# Patient Record
Sex: MALE | Race: WHITE | NOT HISPANIC OR LATINO | Employment: OTHER | URBAN - METROPOLITAN AREA
[De-identification: names, ages, dates, MRNs, and addresses within clinical notes are randomized per-mention and may not be internally consistent; named-entity substitution may affect disease eponyms.]

---

## 2017-03-03 ENCOUNTER — APPOINTMENT (OUTPATIENT)
Dept: CT IMAGING | Facility: HOSPITAL | Age: 72
End: 2017-03-03

## 2017-03-03 ENCOUNTER — HOSPITAL ENCOUNTER (EMERGENCY)
Facility: HOSPITAL | Age: 72
Discharge: HOME | End: 2017-03-03
Attending: EMERGENCY MEDICINE | Admitting: EMERGENCY MEDICINE

## 2017-03-03 VITALS
HEIGHT: 73 IN | DIASTOLIC BLOOD PRESSURE: 77 MMHG | SYSTOLIC BLOOD PRESSURE: 140 MMHG | WEIGHT: 187 LBS | HEART RATE: 70 BPM | TEMPERATURE: 97.5 F | BODY MASS INDEX: 24.78 KG/M2 | OXYGEN SATURATION: 95 % | RESPIRATION RATE: 18 BRPM

## 2017-03-03 DIAGNOSIS — M12.9 ARTHROPATHY: Primary | ICD-10-CM

## 2017-03-03 LAB
ALBUMIN SERPL-MCNC: 4.3 G/DL (ref 3.2–4.8)
ALBUMIN/GLOB SERPL: 0.9 G/DL (ref 1.5–2.5)
ALP SERPL-CCNC: 75 U/L (ref 25–100)
ALT SERPL W P-5'-P-CCNC: 13 U/L (ref 7–40)
ANION GAP SERPL CALCULATED.3IONS-SCNC: 4 MMOL/L (ref 3–11)
AST SERPL-CCNC: 23 U/L (ref 0–33)
BASOPHILS # BLD MANUAL: 0.07 10*3/MM3 (ref 0–0.2)
BASOPHILS NFR BLD AUTO: 1 % (ref 0–1)
BILIRUB SERPL-MCNC: 0.8 MG/DL (ref 0.3–1.2)
BUN BLD-MCNC: 20 MG/DL (ref 9–23)
BUN/CREAT SERPL: 22.2 (ref 7–25)
CALCIUM SPEC-SCNC: 10.2 MG/DL (ref 8.7–10.4)
CHLORIDE SERPL-SCNC: 101 MMOL/L (ref 99–109)
CO2 SERPL-SCNC: 31 MMOL/L (ref 20–31)
CREAT BLD-MCNC: 0.9 MG/DL (ref 0.6–1.3)
CRP SERPL-MCNC: 5.9 MG/DL (ref 0–10)
DEPRECATED RDW RBC AUTO: 48.6 FL (ref 37–54)
ELLIPTOCYTES BLD QL SMEAR: ABNORMAL
EOSINOPHIL # BLD MANUAL: 0 10*3/MM3 (ref 0.1–0.3)
EOSINOPHIL NFR BLD MANUAL: 0 % (ref 0–3)
ERYTHROCYTE [DISTWIDTH] IN BLOOD BY AUTOMATED COUNT: 16.3 % (ref 11.3–14.5)
ERYTHROCYTE [SEDIMENTATION RATE] IN BLOOD: 39 MM/HR (ref 0–20)
GFR SERPL CREATININE-BSD FRML MDRD: 83 ML/MIN/1.73
GLOBULIN UR ELPH-MCNC: 4.6 GM/DL
GLUCOSE BLD-MCNC: 96 MG/DL (ref 70–100)
HCT VFR BLD AUTO: 39.8 % (ref 38.9–50.9)
HGB BLD-MCNC: 13.7 G/DL (ref 13.1–17.5)
LYMPHOCYTES # BLD MANUAL: 1.76 10*3/MM3 (ref 0.6–4.8)
LYMPHOCYTES NFR BLD MANUAL: 26 % (ref 24–44)
LYMPHOCYTES NFR BLD MANUAL: 7 % (ref 0–12)
MCH RBC QN AUTO: 28.2 PG (ref 27–31)
MCHC RBC AUTO-ENTMCNC: 34.4 G/DL (ref 32–36)
MCV RBC AUTO: 81.9 FL (ref 80–99)
METAMYELOCYTES NFR BLD MANUAL: 2 % (ref 0–0)
MONOCYTES # BLD AUTO: 0.47 10*3/MM3 (ref 0–1)
NEUTROPHILS # BLD AUTO: 4.19 10*3/MM3 (ref 1.5–8.3)
NEUTROPHILS NFR BLD MANUAL: 59 % (ref 41–71)
NEUTS BAND NFR BLD MANUAL: 3 % (ref 0–5)
PLAT MORPH BLD: NORMAL
PLATELET # BLD AUTO: 337 10*3/MM3 (ref 150–450)
PMV BLD AUTO: 9.1 FL (ref 6–12)
POLYCHROMASIA BLD QL SMEAR: ABNORMAL
POTASSIUM BLD-SCNC: 4.4 MMOL/L (ref 3.5–5.5)
PROT SERPL-MCNC: 8.9 G/DL (ref 5.7–8.2)
RBC # BLD AUTO: 4.86 10*6/MM3 (ref 4.2–5.76)
SODIUM BLD-SCNC: 136 MMOL/L (ref 132–146)
URATE SERPL-MCNC: 10.3 MG/DL (ref 3.7–9.2)
VARIANT LYMPHS NFR BLD MANUAL: 2 % (ref 0–5)
WBC MORPH BLD: NORMAL
WBC NRBC COR # BLD: 6.76 10*3/MM3 (ref 3.5–10.8)

## 2017-03-03 PROCEDURE — 80053 COMPREHEN METABOLIC PANEL: CPT | Performed by: EMERGENCY MEDICINE

## 2017-03-03 PROCEDURE — 73700 CT LOWER EXTREMITY W/O DYE: CPT

## 2017-03-03 PROCEDURE — 85652 RBC SED RATE AUTOMATED: CPT | Performed by: EMERGENCY MEDICINE

## 2017-03-03 PROCEDURE — 99284 EMERGENCY DEPT VISIT MOD MDM: CPT

## 2017-03-03 PROCEDURE — 86140 C-REACTIVE PROTEIN: CPT | Performed by: EMERGENCY MEDICINE

## 2017-03-03 PROCEDURE — 63710000001 PREDNISONE PER 5 MG: Performed by: EMERGENCY MEDICINE

## 2017-03-03 PROCEDURE — 84550 ASSAY OF BLOOD/URIC ACID: CPT | Performed by: EMERGENCY MEDICINE

## 2017-03-03 PROCEDURE — 85007 BL SMEAR W/DIFF WBC COUNT: CPT | Performed by: EMERGENCY MEDICINE

## 2017-03-03 PROCEDURE — 85025 COMPLETE CBC W/AUTO DIFF WBC: CPT | Performed by: EMERGENCY MEDICINE

## 2017-03-03 RX ORDER — OXYCODONE HYDROCHLORIDE 5 MG/1
TABLET ORAL
Status: COMPLETED
Start: 2017-03-03 | End: 2017-03-03

## 2017-03-03 RX ORDER — COLCHICINE 0.6 MG/1
0.6 TABLET ORAL 2 TIMES DAILY
Qty: 9 TABLET | Refills: 0 | Status: SHIPPED | OUTPATIENT
Start: 2017-03-03

## 2017-03-03 RX ORDER — COLCHICINE 0.6 MG/1
0.6 TABLET ORAL DAILY
Status: DISCONTINUED | OUTPATIENT
Start: 2017-03-03 | End: 2017-03-03 | Stop reason: HOSPADM

## 2017-03-03 RX ORDER — PREDNISONE 20 MG/1
20 TABLET ORAL 2 TIMES DAILY
Qty: 9 TABLET | Refills: 0 | Status: SHIPPED | OUTPATIENT
Start: 2017-03-03

## 2017-03-03 RX ORDER — PREDNISONE 20 MG/1
20 TABLET ORAL ONCE
Status: COMPLETED | OUTPATIENT
Start: 2017-03-03 | End: 2017-03-03

## 2017-03-03 RX ORDER — OXYCODONE HYDROCHLORIDE 5 MG/1
10 TABLET ORAL ONCE
Status: COMPLETED | OUTPATIENT
Start: 2017-03-03 | End: 2017-03-03

## 2017-03-03 RX ADMIN — OXYCODONE HYDROCHLORIDE 5 MG: 5 TABLET ORAL at 10:32

## 2017-03-03 RX ADMIN — PREDNISONE 20 MG: 20 TABLET ORAL at 15:19

## 2017-03-03 RX ADMIN — OXYCODONE HYDROCHLORIDE 5 MG: 5 TABLET ORAL at 10:29

## 2017-03-03 RX ADMIN — COLCHICINE 0.6 MG: 0.6 TABLET, FILM COATED ORAL at 15:19

## 2017-03-03 NOTE — DISCHARGE INSTRUCTIONS
Keep foot elevated. Bear weight as you are able.    Return to the Emergency Department for new or worsening concerns.

## 2017-03-03 NOTE — ED PROVIDER NOTES
Subjective   HPI Comments: Bhupendra Coombs is a 71 y.o.male who presents to the ED c/o RLE pain and swelling since 1/1/17. Pt states he was admitted to [facility] with PNA when he started developing right foot pain. Then on January 1st stents were placed in his right common femoral and right popliteal arteries to improve blood flow. Since then he has had worsening right leg pain and swelling especially in his foot. He was scheduled to have further bypass surgery today, however, his pulmonologist canceled it over concern for a spot on his lungs. He describes a soreness throughout his leg with occasional burning pain in his foot. He denies any cough, rhinorrhea, SOA or CP. Pt is a smoker and uses an inhaler and home O2.    Dr. Gann is his cardiologist.    PMHx of HTN and COPD, but no CVA and MI.    Patient is a 71 y.o. male presenting with lower extremity pain.   History provided by:  Patient and spouse  Lower Extremity Issue   Location:  Leg  Time since incident:  2 months  Leg location:  R leg  Pain details:     Quality:  Aching and burning    Radiates to:  Does not radiate    Severity:  Severe    Duration:  2 months    Timing:  Constant    Progression:  Worsening  Chronicity:  New  Dislocation: no    Associated symptoms: swelling    Associated symptoms: no stiffness    Swelling:     Location:  Feet    Duration:  2 months    Timing:  Constant    Progression:  Worsening    Chronicity:  New  Risk factors: recent illness        Review of Systems   HENT: Negative for rhinorrhea.    Respiratory: Negative for cough and shortness of breath.    Cardiovascular: Negative for chest pain.   Musculoskeletal: Negative for stiffness.   All other systems reviewed and are negative.      No past medical history on file.    Allergies   Allergen Reactions   • Sulfa Antibiotics        No past surgical history on file.    No family history on file.    Social History     Social History   • Marital status:      Spouse name:  N/A   • Number of children: N/A   • Years of education: N/A     Social History Main Topics   • Smoking status: Light Tobacco Smoker     Types: Cigarettes   • Smokeless tobacco: Not on file   • Alcohol use No   • Drug use: No   • Sexual activity: Not on file     Other Topics Concern   • Not on file     Social History Narrative   • No narrative on file         Objective   Physical Exam   Constitutional: He is oriented to person, place, and time. He appears well-developed and well-nourished.   HENT:   Head: Normocephalic and atraumatic.   Right Ear: External ear normal.   Left Ear: External ear normal.   Nose: Nose normal.   Mouth/Throat: Oropharynx is clear and moist.   Airway is patent and pharynx is benign.   Eyes: Conjunctivae and EOM are normal. No scleral icterus.   Neck: Normal range of motion. Neck supple. No JVD present.   Cardiovascular: Normal rate, regular rhythm and normal heart sounds.  Exam reveals no gallop and no friction rub.    No murmur heard.  Good right femoral pulse. Significant bruit in right femoral artery. Could not feel right popliteal artery pulse or dorsalis pedis pulse .   Pulmonary/Chest: Effort normal and breath sounds normal. No respiratory distress. He has no wheezes. He has no rales.   Abdominal: Soft. There is no tenderness.   Musculoskeletal: Normal range of motion. He exhibits edema (Moderate swelling of right foot.) and tenderness (Minimal tenderness in right thigh. Moderate tenderness in lower leg. Severe tenderness in right foot.).   Lymphadenopathy:     He has no cervical adenopathy.   Neurological: He is alert and oriented to person, place, and time.   Skin: Skin is warm and dry. There is erythema (Moderate amount of erythema of right foot, not consistent with cellulitis.).   Right foot is warm.   Psychiatric: He has a normal mood and affect. His behavior is normal.   Nursing note and vitals reviewed.      Procedures         ED Course  ED Course   Comment By Time   Definitive  diagnosis is difficult to come up with.  The problem does not seem to be ischemic to me, as his foot is warm with good capillary refill.  He may have an atypical gout attack with elevated uric acid suggesting that.  I will treat him with twice a day colchicine and prednisone for 5 days.  I will refer him to an orthopedist.  Of course, he still has the issues of possible pulmonary malignancy to deal with. Ruperto Clark MD 03/03 1451       Recent Results (from the past 24 hour(s))   Comprehensive Metabolic Panel    Collection Time: 03/03/17 10:23 AM   Result Value Ref Range    Glucose 96 70 - 100 mg/dL    BUN 20 9 - 23 mg/dL    Creatinine 0.90 0.60 - 1.30 mg/dL    Sodium 136 132 - 146 mmol/L    Potassium 4.4 3.5 - 5.5 mmol/L    Chloride 101 99 - 109 mmol/L    CO2 31.0 20.0 - 31.0 mmol/L    Calcium 10.2 8.7 - 10.4 mg/dL    Total Protein 8.9 (H) 5.7 - 8.2 g/dL    Albumin 4.30 3.20 - 4.80 g/dL    ALT (SGPT) 13 7 - 40 U/L    AST (SGOT) 23 0 - 33 U/L    Alkaline Phosphatase 75 25 - 100 U/L    Total Bilirubin 0.8 0.3 - 1.2 mg/dL    eGFR Non African Amer 83 >60 mL/min/1.73    Globulin 4.6 gm/dL    A/G Ratio 0.9 (L) 1.5 - 2.5 g/dL    BUN/Creatinine Ratio 22.2 7.0 - 25.0    Anion Gap 4.0 3.0 - 11.0 mmol/L   C-reactive Protein    Collection Time: 03/03/17 10:23 AM   Result Value Ref Range    C-Reactive Protein 5.90 0.00 - 10.00 mg/dL   Sedimentation Rate    Collection Time: 03/03/17 10:23 AM   Result Value Ref Range    Sed Rate 39 (H) 0 - 20 mm/hr   Uric Acid    Collection Time: 03/03/17 10:23 AM   Result Value Ref Range    Uric Acid 10.3 (H) 3.7 - 9.2 mg/dL   CBC Auto Differential    Collection Time: 03/03/17 10:23 AM   Result Value Ref Range    WBC 6.76 3.50 - 10.80 10*3/mm3    RBC 4.86 4.20 - 5.76 10*6/mm3    Hemoglobin 13.7 13.1 - 17.5 g/dL    Hematocrit 39.8 38.9 - 50.9 %    MCV 81.9 80.0 - 99.0 fL    MCH 28.2 27.0 - 31.0 pg    MCHC 34.4 32.0 - 36.0 g/dL    RDW 16.3 (H) 11.3 - 14.5 %    RDW-SD 48.6 37.0 - 54.0 fl    MPV  9.1 6.0 - 12.0 fL    Platelets 337 150 - 450 10*3/mm3   Manual Differential    Collection Time: 03/03/17 10:23 AM   Result Value Ref Range    Neutrophil % 59.0 41.0 - 71.0 %    Lymphocyte % 26.0 24.0 - 44.0 %    Monocyte % 7.0 0.0 - 12.0 %    Eosinophil % 0.0 0.0 - 3.0 %    Basophil % 1.0 0.0 - 1.0 %    Bands %  3.0 0.0 - 5.0 %    Metamyelocyte % 2.0 (H) 0.0 - 0.0 %    Atypical Lymphocyte % 2.0 0.0 - 5.0 %    Neutrophils Absolute 4.19 1.50 - 8.30 10*3/mm3    Lymphocytes Absolute 1.76 0.60 - 4.80 10*3/mm3    Monocytes Absolute 0.47 0.00 - 1.00 10*3/mm3    Eosinophils Absolute 0.00 (L) 0.10 - 0.30 10*3/mm3    Basophils Absolute 0.07 0.00 - 0.20 10*3/mm3    Elliptocytes Mod/2+ None Seen    Polychromasia Slight/1+ None Seen    WBC Morphology Normal Normal    Platelet Morphology Normal Normal     Note: In addition to lab results from this visit, the labs listed above may include labs taken at another facility or during a different encounter within the last 24 hours. Please correlate lab times with ED admission and discharge times for further clarification of the services performed during this visit.    CT Lower Extremity Right Without Contrast   Final Result   1. Marked diffuse subcutis, deep muscle and integument edema is noted   throughout the right lower leg, right ankle and right foot.   2. Severe hypertrophic degenerative arthropathy is seen in the middle   and distal third of the right first metatarsal extending to the   metatarsophalangeal joint where there is bone on bone and high-grade   bulky reactive osteophytes with loose bodies surrounding the severe   arthropathic abnormality in the right first    metatarsophalangeal joint. Osteoarthropathy versus gout are the   considerations.   3. Active or destructive bone lesion is not identified. Acute fracture   or stress related abnormality of the periosteum is not identified. Ankle   and hindfoot are intact without evidence of focal mass or bone   destruction.  Although there is edema diffusely in the right foot which   is worse in the right forefoot, there is no dominant focal mass or   drainable collection and no other acute findings are noted. Extensive   arterial vascular calcifications are noted.       D:  03/03/2017   E:  03/03/2017       This report was finalized on 3/3/2017 2:10 PM by Dr. Santos Flores MD.            Vitals:    03/03/17 1300 03/03/17 1340 03/03/17 1400 03/03/17 1520   BP: 127/70 129/74 124/84 140/77   BP Location:    Left arm   Patient Position:    Sitting   Pulse: 60  64 70   Resp:    18   Temp:    97.5 °F (36.4 °C)   TempSrc:    Oral   SpO2: 94%  94% 95%   Weight:       Height:         Medications   oxyCODONE (ROXICODONE) immediate release tablet 10 mg (5 mg Oral Given 3/3/17 1029)   oxyCODONE (ROXICODONE) 5 MG immediate release tablet  - ADS Override Pull (5 mg  Given 3/3/17 1032)   predniSONE (DELTASONE) tablet 20 mg (20 mg Oral Given 3/3/17 1519)     ECG/EMG Results (last 24 hours)     ** No results found for the last 24 hours. **        Recent Results (from the past 24 hour(s))   Comprehensive Metabolic Panel    Collection Time: 03/03/17 10:23 AM   Result Value Ref Range    Glucose 96 70 - 100 mg/dL    BUN 20 9 - 23 mg/dL    Creatinine 0.90 0.60 - 1.30 mg/dL    Sodium 136 132 - 146 mmol/L    Potassium 4.4 3.5 - 5.5 mmol/L    Chloride 101 99 - 109 mmol/L    CO2 31.0 20.0 - 31.0 mmol/L    Calcium 10.2 8.7 - 10.4 mg/dL    Total Protein 8.9 (H) 5.7 - 8.2 g/dL    Albumin 4.30 3.20 - 4.80 g/dL    ALT (SGPT) 13 7 - 40 U/L    AST (SGOT) 23 0 - 33 U/L    Alkaline Phosphatase 75 25 - 100 U/L    Total Bilirubin 0.8 0.3 - 1.2 mg/dL    eGFR Non African Amer 83 >60 mL/min/1.73    Globulin 4.6 gm/dL    A/G Ratio 0.9 (L) 1.5 - 2.5 g/dL    BUN/Creatinine Ratio 22.2 7.0 - 25.0    Anion Gap 4.0 3.0 - 11.0 mmol/L   C-reactive Protein    Collection Time: 03/03/17 10:23 AM   Result Value Ref Range    C-Reactive Protein 5.90 0.00 - 10.00 mg/dL   Sedimentation Rate     Collection Time: 03/03/17 10:23 AM   Result Value Ref Range    Sed Rate 39 (H) 0 - 20 mm/hr   Uric Acid    Collection Time: 03/03/17 10:23 AM   Result Value Ref Range    Uric Acid 10.3 (H) 3.7 - 9.2 mg/dL   CBC Auto Differential    Collection Time: 03/03/17 10:23 AM   Result Value Ref Range    WBC 6.76 3.50 - 10.80 10*3/mm3    RBC 4.86 4.20 - 5.76 10*6/mm3    Hemoglobin 13.7 13.1 - 17.5 g/dL    Hematocrit 39.8 38.9 - 50.9 %    MCV 81.9 80.0 - 99.0 fL    MCH 28.2 27.0 - 31.0 pg    MCHC 34.4 32.0 - 36.0 g/dL    RDW 16.3 (H) 11.3 - 14.5 %    RDW-SD 48.6 37.0 - 54.0 fl    MPV 9.1 6.0 - 12.0 fL    Platelets 337 150 - 450 10*3/mm3   Manual Differential    Collection Time: 03/03/17 10:23 AM   Result Value Ref Range    Neutrophil % 59.0 41.0 - 71.0 %    Lymphocyte % 26.0 24.0 - 44.0 %    Monocyte % 7.0 0.0 - 12.0 %    Eosinophil % 0.0 0.0 - 3.0 %    Basophil % 1.0 0.0 - 1.0 %    Bands %  3.0 0.0 - 5.0 %    Metamyelocyte % 2.0 (H) 0.0 - 0.0 %    Atypical Lymphocyte % 2.0 0.0 - 5.0 %    Neutrophils Absolute 4.19 1.50 - 8.30 10*3/mm3    Lymphocytes Absolute 1.76 0.60 - 4.80 10*3/mm3    Monocytes Absolute 0.47 0.00 - 1.00 10*3/mm3    Eosinophils Absolute 0.00 (L) 0.10 - 0.30 10*3/mm3    Basophils Absolute 0.07 0.00 - 0.20 10*3/mm3    Elliptocytes Mod/2+ None Seen    Polychromasia Slight/1+ None Seen    WBC Morphology Normal Normal    Platelet Morphology Normal Normal     Note: In addition to lab results from this visit, the labs listed above may include labs taken at another facility or during a different encounter within the last 24 hours. Please correlate lab times with ED admission and discharge times for further clarification of the services performed during this visit.    CT Lower Extremity Right Without Contrast   Final Result   1. Marked diffuse subcutis, deep muscle and integument edema is noted   throughout the right lower leg, right ankle and right foot.   2. Severe hypertrophic degenerative arthropathy is seen in  the middle   and distal third of the right first metatarsal extending to the   metatarsophalangeal joint where there is bone on bone and high-grade   bulky reactive osteophytes with loose bodies surrounding the severe   arthropathic abnormality in the right first    metatarsophalangeal joint. Osteoarthropathy versus gout are the   considerations.   3. Active or destructive bone lesion is not identified. Acute fracture   or stress related abnormality of the periosteum is not identified. Ankle   and hindfoot are intact without evidence of focal mass or bone   destruction. Although there is edema diffusely in the right foot which   is worse in the right forefoot, there is no dominant focal mass or   drainable collection and no other acute findings are noted. Extensive   arterial vascular calcifications are noted.       D:  03/03/2017   E:  03/03/2017       This report was finalized on 3/3/2017 2:10 PM by Dr. Santos Flores MD.            Vitals:    03/03/17 1300 03/03/17 1340 03/03/17 1400 03/03/17 1520   BP: 127/70 129/74 124/84 140/77   BP Location:    Left arm   Patient Position:    Sitting   Pulse: 60  64 70   Resp:    18   Temp:    97.5 °F (36.4 °C)   TempSrc:    Oral   SpO2: 94%  94% 95%   Weight:       Height:         Medications   oxyCODONE (ROXICODONE) immediate release tablet 10 mg (5 mg Oral Given 3/3/17 1029)   oxyCODONE (ROXICODONE) 5 MG immediate release tablet  - ADS Override Pull (5 mg  Given 3/3/17 1032)   predniSONE (DELTASONE) tablet 20 mg (20 mg Oral Given 3/3/17 1519)     ECG/EMG Results (last 24 hours)     ** No results found for the last 24 hours. **                      [facility]    Final diagnoses:   Arthropathy       Documentation assistance provided by paolo Dutta.  Information recorded by the paolo was done at my direction and has been verified and validated by me.     Devin Dutta  03/03/17 1017       Devin Dutta  03/03/17 1244       Devin Franklin  Luzmaria  03/03/17 1458       Ruperto Clark MD  03/03/17 [id number]